# Patient Record
Sex: FEMALE | Race: WHITE | Employment: FULL TIME | URBAN - METROPOLITAN AREA
[De-identification: names, ages, dates, MRNs, and addresses within clinical notes are randomized per-mention and may not be internally consistent; named-entity substitution may affect disease eponyms.]

---

## 2017-01-01 ENCOUNTER — HOSPITAL ENCOUNTER (INPATIENT)
Facility: CLINIC | Age: 22
LOS: 3 days | Discharge: HOME | DRG: 885 | End: 2017-01-05
Attending: PSYCHIATRY & NEUROLOGY | Admitting: PSYCHIATRY & NEUROLOGY
Payer: COMMERCIAL

## 2017-01-01 DIAGNOSIS — R45.89 SUICIDAL BEHAVIOR: ICD-10-CM

## 2017-01-01 DIAGNOSIS — F41.8 DEPRESSION WITH ANXIETY: Primary | ICD-10-CM

## 2017-01-01 PROCEDURE — 12800001 ZZH R&B CD/MH ADOLESCENT

## 2017-01-01 PROCEDURE — 12400001 ZZH R&B MH UMMC

## 2017-01-01 NOTE — IP AVS SNAPSHOT
MRN:[id number]                      After Visit Summary   1/1/2017    Miya Perez    MRN: [id number]           Patient Information     Date Of Birth          1995        About your hospital stay     You were admitted on:  January 1, 2017 You last received care in the:  [facility]    You were discharged on:  January 5, 2017       Who to Call     For medical emergencies, please call 911.  For non-urgent questions about your medical care, please call your primary care provider or clinic, [phone]          Attending Provider     Provider    Robin Graf MD       Primary Care Provider Office Phone # Fax #    Kuhs Story [phone]       [facility] [address] [facility] [address]        Further instructions from your care team       Behavioral Discharge Planning and Instructions      Summary: You were admitted on 1/1/2017 to Station [facility]A for suicidal ideation and depression.  You were treated by Debra Naegele, APRN, CNS and discharged on 1/5/17.     Disposition: Discharged to home    Main Diagnosis:  Major depressive disorder    Health Care Follow-up Appointments:   Therapy Appointment   Date: Monday, January 10, 2017  Time: 10:00am      Provider: Germán Middleton  Address: [facility]. [address] [place] [address]. [place]  [address]  Phone:[phone]     Medication Management   Date: Tuesday, January 17, 2017       Time: 9:30am (arrive 15 mins early to complete paperwork)  Provider: Dr. Daniels  (Telemed provider)  Address: [address] [place]  [address]  Phone:[phone]   [facility] has faxed the Discharge Summary and AVS to this provider at Fax: [phone]      Attend all scheduled appointments with your outpatient providers. Call at least 24 hours in advance if you need to reschedule an appointment to ensure continued access to your outpatient providers.   Major Treatments, Procedures and Findings:  "You were provided with: a psychiatric assessment, assessed for medical stability, medication evaluation and/or management, group therapy and milieu management    Symptoms to Report: mood getting worse or thoughts of suicide    Early warning signs can include:  increased depression or anxiety sleep disturbances increased thoughts or behaviors of suicide or self-harm     Safety and Wellness:  Take all medicines as directed.  Make no changes unless your doctor suggests them.      Follow treatment recommendations.  Refrain from alcohol and non-prescribed drugs.  If there is a concern for safety, call [phone].    Resources: Mental health crisis response for your county is offered 24 hours a day, 7 days a week. A trained counselor will assess your current situation, offer support and counseling and connect you with local resources. Please call [phone]    The treatment team has appreciated the opportunity to work with you. Miya, please take care and make your recovery a daily recovery. If you have any questions or concerns our unit number is [phone].  You will be receiving a follow-up phone call within the next three days from a representative from behavioral health.  You have identified the best phone number to reach you as [phone]              Pending Results     No orders found from 12/31/2016 to 1/2/2017.            Admission Information        Provider Department Dept Phone    1/1/2017 Robin Graf MD [facility]  [phone]      Your Vitals Were     Blood Pressure Pulse Temperature Respirations Weight       113/57 mmHg 58 97.8  F (36.6  C) (Oral) 16 54.432 kg (120 lb)       MyChart Information     [employer] lets you send messages to your doctor, view your test results, renew your prescriptions, schedule appointments and more. To sign up, go to [email or web address] . Click on \"Log in\" on the left side of the screen, which will take you to the Welcome page. Then click on \"Sign up Now\" " on the right side of the page.     You will be asked to enter the access code listed below, as well as some personal information. Please follow the directions to create your username and password.     Your access code is: [email or web address]  Expires: 2017  2:42 PM     Your access code will  in 90 days. If you need help or a new code, please call your [facility] clinic or [phone].        Care EveryWhere ID     This is your Care EveryWhere ID. This could be used by other organizations to access your [facility] medical records  [id number]           Review of your medicines      START taking        Dose / Directions    hydrOXYzine 25 MG tablet   Commonly known as:  ATARAX   Used for:  Depression with anxiety        Dose:  25-50 mg   Take 1-2 tablets (25-50 mg) by mouth every 4 hours as needed for anxiety   Quantity:  120 tablet   Refills:  1       prazosin 500 MCG capsule   Commonly known as:  MINIPRESS   Used for:  Suicidal behavior        Dose:  0.5 mg   Take 1 capsule (0.5 mg) by mouth At Bedtime   Quantity:  30 capsule   Refills:  1         CONTINUE these medicines which have NOT CHANGED        Dose / Directions    ABILIFY 5 MG tablet   Generic drug:  ARIPiprazole        Dose:  5 mg   Take 5 mg by mouth daily To be started 17 per patient.   Refills:  0       TYLENOL 325 MG tablet   Generic drug:  acetaminophen        Dose:  1-2 tablet   Take 1-2 tablets by mouth every 6 hours as needed.   Refills:  0       ZOLOFT PO        Dose:  100 mg   Take 100 mg by mouth daily   Refills:  0            Where to get your medicines      These medications were sent to [facility] [address]  [address] [address]     Phone:  [phone]    - hydrOXYzine 25 MG tablet  - prazosin 500 MCG capsule             Protect others around you: Learn how to safely use, store and throw away your medicines at www.disposemymeds.org.             Medication List: This  is a list of all your medications and when to take them. Check marks below indicate your daily home schedule. Keep this list as a reference.      Medications           Morning Afternoon Evening Bedtime As Needed    ABILIFY 5 MG tablet   Take 5 mg by mouth daily To be started Sunday 1/1/17 per patient.   Last time this was given:  5 mg on 1/4/2017  8:14 PM   Generic drug:  ARIPiprazole                                hydrOXYzine 25 MG tablet   Commonly known as:  ATARAX   Take 1-2 tablets (25-50 mg) by mouth every 4 hours as needed for anxiety                                prazosin 500 MCG capsule   Commonly known as:  MINIPRESS   Take 1 capsule (0.5 mg) by mouth At Bedtime   Last time this was given:  1 mg on 1/4/2017  8:14 PM                                TYLENOL 325 MG tablet   Take 1-2 tablets by mouth every 6 hours as needed.   Last time this was given:  650 mg on 1/4/2017  2:21 PM   Generic drug:  acetaminophen                                ZOLOFT PO   Take 100 mg by mouth daily   Last time this was given:  100 mg on 1/4/2017  8:14 PM

## 2017-01-01 NOTE — IP AVS SNAPSHOT
[facility]    [facility] [address]    [address] [address]    Phone:  [phone]                                       After Visit Summary   1/1/2017    Miya Perez    MRN: [id number]           After Visit Summary Signature Page     I have received my discharge instructions, and my questions have been answered. I have discussed any challenges I see with this plan with the nurse or doctor.    ..........................................................................................................................................  Patient/Patient Representative Signature      ..........................................................................................................................................  Patient Representative Print Name and Relationship to Patient    ..................................................               ................................................  Date                                            Time    ..........................................................................................................................................  Reviewed by Signature/Title    ...................................................              ..............................................  Date                                                            Time

## 2017-01-02 PROBLEM — R45.89 SUICIDAL BEHAVIOR: Status: ACTIVE | Noted: 2017-01-02

## 2017-01-02 PROCEDURE — 25000132 ZZH RX MED GY IP 250 OP 250 PS 637: Performed by: CLINICAL NURSE SPECIALIST

## 2017-01-02 PROCEDURE — 99221 1ST HOSP IP/OBS SF/LOW 40: CPT | Mod: AI | Performed by: CLINICAL NURSE SPECIALIST

## 2017-01-02 PROCEDURE — 12800001 ZZH R&B CD/MH ADOLESCENT

## 2017-01-02 PROCEDURE — 25000132 ZZH RX MED GY IP 250 OP 250 PS 637: Performed by: PSYCHIATRY & NEUROLOGY

## 2017-01-02 PROCEDURE — 12400001 ZZH R&B MH UMMC

## 2017-01-02 PROCEDURE — H2032 ACTIVITY THERAPY, PER 15 MIN: HCPCS

## 2017-01-02 RX ORDER — ACETAMINOPHEN 325 MG/1
650 TABLET ORAL EVERY 4 HOURS PRN
Status: DISCONTINUED | OUTPATIENT
Start: 2017-01-02 | End: 2017-01-05 | Stop reason: HOSPADM

## 2017-01-02 RX ORDER — ALUMINA, MAGNESIA, AND SIMETHICONE 2400; 2400; 240 MG/30ML; MG/30ML; MG/30ML
30 SUSPENSION ORAL EVERY 4 HOURS PRN
Status: DISCONTINUED | OUTPATIENT
Start: 2017-01-02 | End: 2017-01-05 | Stop reason: HOSPADM

## 2017-01-02 RX ORDER — ARIPIPRAZOLE 5 MG/1
5 TABLET ORAL DAILY
COMMUNITY

## 2017-01-02 RX ORDER — ARIPIPRAZOLE 5 MG/1
5 TABLET ORAL DAILY
Status: DISCONTINUED | OUTPATIENT
Start: 2017-01-02 | End: 2017-01-05 | Stop reason: HOSPADM

## 2017-01-02 RX ORDER — IBUPROFEN 200 MG
400 TABLET ORAL EVERY 6 HOURS PRN
Status: DISCONTINUED | OUTPATIENT
Start: 2017-01-02 | End: 2017-01-05 | Stop reason: HOSPADM

## 2017-01-02 RX ORDER — HYDROXYZINE HYDROCHLORIDE 25 MG/1
25-50 TABLET, FILM COATED ORAL EVERY 4 HOURS PRN
Status: DISCONTINUED | OUTPATIENT
Start: 2017-01-02 | End: 2017-01-05 | Stop reason: HOSPADM

## 2017-01-02 RX ADMIN — ARIPIPRAZOLE 5 MG: 5 TABLET ORAL at 20:28

## 2017-01-02 RX ADMIN — IBUPROFEN 400 MG: 200 TABLET, FILM COATED ORAL at 22:28

## 2017-01-02 RX ADMIN — SERTRALINE HYDROCHLORIDE 100 MG: 50 TABLET ORAL at 20:28

## 2017-01-02 ASSESSMENT — ACTIVITIES OF DAILY LIVING (ADL)
GROOMING: INDEPENDENT
AMBULATION: 0-->INDEPENDENT
BATHING: 0-->INDEPENDENT
ORAL_HYGIENE: INDEPENDENT
TRANSFERRING: 0-->INDEPENDENT
DRESS: INDEPENDENT
FALL_HISTORY_WITHIN_LAST_SIX_MONTHS: NO
DRESS: INDEPENDENT
RETIRED_COMMUNICATION: 0-->UNDERSTANDS/COMMUNICATES WITHOUT DIFFICULTY
DRESS: 0-->INDEPENDENT
SWALLOWING: 0-->SWALLOWS FOODS/LIQUIDS WITHOUT DIFFICULTY
TOILETING: 0-->INDEPENDENT
GROOMING: INDEPENDENT
COGNITION: 0 - NO COGNITION ISSUES REPORTED
RETIRED_EATING: 0-->INDEPENDENT
ORAL_HYGIENE: INDEPENDENT

## 2017-01-02 NOTE — PLAN OF CARE
Problem: General Plan of Care (Inpatient Behavioral)  Goal: Team Discussion  Team Plan:   BEHAVIORAL TEAM DISCUSSION    Continued Stay Criteria/Rationale: Patient is newly admitted with depression and SI. Evaluation in process.  Plan: Provide a safe environment and therapeutic milieu. Encourage participation in unit programming. Develop appropriate aftercare plan.  Participants: Lady MEDINASW; Debbie Naegele APRN; CNS  Summary/Recommendation: medication evaluation; referrals for new providers as appropriate.  Medical/Physical: stable  Progress: improving.    Illness Management Recovery model: Personal Plan of Care    Patient has not yet completed Personal Plan of Care, identifying reasons for hospitalization and goals for discharge.

## 2017-01-02 NOTE — PROGRESS NOTES
Pt. Came from [facility].   She recently had a break up with her boyfriend.  She took some of her dad pills and her friends caught her before she swallowed them.  She denies alcohol and drug use although her BAL was .127 her uds was negative.  She was placed on a 72 hour hold while there.  Pt. Is angry about being here.  Currently she denies any psych symptoms.  During the interview she was sarcastic and gave mostly one word answers.  HX of MDD, SIB and LEXIE. Admission completed and pt. went to bed.

## 2017-01-02 NOTE — PROGRESS NOTES
Pt was calm and cooperative today. Her affect is blunted, flat, and tense. She seems distant, but went to all the groups. Speech was pressured when talking 1:1 with her. She was short and gave one word answers. She said she feels safe here and denied thoughts of SI/SIB. Her goal is to be discharged after her 72 HR hold. Said groups were going good and that she is getting stuff out of attending them.       01/02/17 1319   Behavioral Health   Hallucinations denies / not responding to hallucinations   Thinking intact   Orientation person: oriented;place: oriented;date: oriented   Memory baseline memory   Insight insight appropriate to situation   Judgement impaired   Eye Contact at examiner   Affect blunted, flat;tense   Mood mood is calm;other (see comments)  (distant)   Physical Appearance/Attire attire appropriate to age and situation   Hygiene other (see comment)  (fair)   Suicidality other (see comments)  (denies)   Self Injury other (see comment)  (denies)   Activity other (see comment)  (present)   Speech clear;coherent   Psychomotor / Gait balanced;steady   Coping/Psychosocial   Verbalized Emotional State other (see comments)  (says shes doing good)   Plan Of Care Reviewed With patient   Psycho Education   Type of Intervention structured groups   Response participates, initiates socially appropriate   Hours 0.5   Treatment Detail triggers   Activities of Daily Living   Hygiene/Grooming independent   Oral Hygiene independent   Dress independent   Room Organization independent   Groups   Details attended   Behavioral Health Interventions   Depression maintain safe secure environment;provide emotional support   Social and Therapeutic Interventions (Depression) encourage socialization with peers;encourage participation in therapeutic groups and milieu activities

## 2017-01-02 NOTE — PROGRESS NOTES
"Initial Psychosocial Assessment    I have reviewed the chart, met with the patient, and developed Care Plan.  Information for assessment was obtained from patient and chart notes.     Presenting Problem:  Patient was admitted on a 72 hour hold.    Per EMR: 22 YO female brought to ED for evaluation post SA by OD. Pt stated she does not know what medication she took as it was her fathers prescription. Friends state they were able to intervene before pt swallowed the pills; pt reports she swallowed the pills. Pt reports recent break up and went on to state, 'I don't need stressors to be stressed.\" Pt reports low mood, nightmares / sleep disturbance nightly, SI, feelings of hopelessness and worthlessness, fatigue.  Pt denies substance use / abuse. Pt has an OP psychiatrist; has been seeing a therapist off / on for 5 years; does not like either. Pt refused to provide their names. Pt asked friends and family to leave the room during assessment. Pt is prescribed Zoloft. Poor insight, impaired judgement. Hx of cutting; last cut one month ago on her stomach. Denies any hx of violence.  Pt continues to report SI with plan to OD. DX:  MDD, LEXIE. Some oppositional and defiant attitude present.     Today patient indicates she is less angry and feeling better than at admission. She has been attending groups. She would like to discharge on Thursday.  She states she did not swallow pills PTA, that someone stopped her from doing it.     History of Mental Health and Chemical Dependency:  Patient has a history of MDD, LEXIE and SIB.  This is her first mental health admission.    Family Description (Constellation, Family Psychiatric History):  Patient grew up with her parents. No siblings.  Patient is single, no children.    Significant Life Events (Illness, Abuse, Trauma, Death):  Patient reports her dad was in a coma last year due to multiple MIs and also sustained a TBI    Living Situation:  Patient lives with her parents when she is " not in school.  When in school she lives on campus at [employer].    Educational Background:  Patient completed high school and is a freshman in college.    Occupational History:  Patient works part time at a skilled nursing facility, [employer].    Financial Status:  No concerns.  Parents support and are paying for college.    Legal Issues:  None     Ethnic/Cultural Issues:  No issues noted.    Spiritual Orientation:  Not assessed     Service History:  None    Social Functioning (organization, interests):  Patient enjoys shopping, volleyball, Netflix, being with friends, going to the gym.    Current Treatment Providers are:  Psychiatrist is Kush Colon at [facility], final appointment will be 1/27/17.  Therapist is Pamela at [place] Assessment/Plan:  Patient hopes to continue with her PTA medications. She would like assistance with locating mental health providers in [place]. She hopes for discharge on Thursday. She plans to return to school on Sunday to prepare for the new semester. She indicates improvement in her mood since admission.  Patient will continue to meet with the treatment team daily to develop appropriate plan of care and CTC will assist as needed.

## 2017-01-02 NOTE — PROGRESS NOTES
Illness Management Recovery model:  Triggers.     Patient identified the following triggers which may exacerbate their illness:    1. Significant life events  2.   3.

## 2017-01-02 NOTE — PROGRESS NOTES
Pt requesting her PTA medication of Zoloft 100mg and also stated she was to start Abilify 5mg on Sunday 1/1/7 per her last psychiatrist appt.  She sees Kush Wilcox at the [facility].  Received LIS and faxed to Clinic for collateral.  Spoke to inpatient provider and she stated she would be ordering medications.    Changed administration times to 2000 per patient request.
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
## 2017-01-02 NOTE — PROGRESS NOTES
01/02/17 1600   Art Therapy   Type of Intervention structured groups   Response participates with encouragement   Hours 2.5   Participated in Art Therapy directive, inside outside portraits.   Third hour we did sun salutations in a chair and learned some relaxing breaths.  Pt was guarded but did share worked  on her portrait and described how people have misconceptions about her. It was a very simple drawing but had a powerful meaning. She missed yoga but did stay two hours even after her project was done and started some conversation with peers about her hunting and fishing with her father. She joined the drawing [place] and was pleasant with peers and helped select positive songs. She remains guarded but does try to socialize with peers.

## 2017-01-02 NOTE — PROGRESS NOTES
Patient arrived to Station 4A at 2310.  Writer and second female staff took report from EMS, gathered personal care supplies, clean scrubs and assisted the patient with a search.  Patient shirt, bra, pants, shoes and $10.00 were given to her mother per patient request.  Patient signed Consent for EHR and LIS for mom.  Patient spoke with mom briefly.  Mother left and patient went to complete interview with NOC Nurse.  NOC nurse will complete patient admission.

## 2017-01-02 NOTE — PROGRESS NOTES
Pt belongings searched by writer and placed in pt bin:   Makeup bag with various cosmetic products;  1 apple cell phone;  1 cell phone  with block;  3 bottles of clearproof cleaning products.

## 2017-01-03 LAB
ALBUMIN SERPL-MCNC: 3.7 G/DL (ref 3.4–5)
ALP SERPL-CCNC: 69 U/L (ref 40–150)
ALT SERPL W P-5'-P-CCNC: 14 U/L (ref 0–50)
ANION GAP SERPL CALCULATED.3IONS-SCNC: 6 MMOL/L (ref 3–14)
AST SERPL W P-5'-P-CCNC: 12 U/L (ref 0–35)
BASOPHILS # BLD AUTO: 0 10E9/L (ref 0–0.2)
BASOPHILS NFR BLD AUTO: 0.3 %
BILIRUB SERPL-MCNC: 0.8 MG/DL (ref 0.2–1.3)
BUN SERPL-MCNC: 14 MG/DL (ref 7–30)
CALCIUM SERPL-MCNC: 8.9 MG/DL (ref 8.5–10.1)
CHLORIDE SERPL-SCNC: 108 MMOL/L (ref 96–110)
CO2 SERPL-SCNC: 27 MMOL/L (ref 20–32)
CREAT SERPL-MCNC: 0.84 MG/DL (ref 0.5–1)
DIFFERENTIAL METHOD BLD: ABNORMAL
EOSINOPHIL # BLD AUTO: 0.2 10E9/L (ref 0–0.7)
EOSINOPHIL NFR BLD AUTO: 4.8 %
ERYTHROCYTE [DISTWIDTH] IN BLOOD BY AUTOMATED COUNT: 12.5 % (ref 10–15)
GFR SERPL CREATININE-BSD FRML MDRD: 87 ML/MIN/1.7M2
GLUCOSE SERPL-MCNC: 94 MG/DL (ref 70–99)
HCT VFR BLD AUTO: 39.6 % (ref 35–47)
HGB BLD-MCNC: 13.5 G/DL (ref 11.7–15.7)
IMM GRANULOCYTES # BLD: 0 10E9/L (ref 0–0.4)
IMM GRANULOCYTES NFR BLD: 0.3 %
LYMPHOCYTES # BLD AUTO: 1.5 10E9/L (ref 0.8–5.3)
LYMPHOCYTES NFR BLD AUTO: 41.1 %
MCH RBC QN AUTO: 30 PG (ref 26.5–33)
MCHC RBC AUTO-ENTMCNC: 34.1 G/DL (ref 31.5–36.5)
MCV RBC AUTO: 88 FL (ref 78–100)
MONOCYTES # BLD AUTO: 0.2 10E9/L (ref 0–1.3)
MONOCYTES NFR BLD AUTO: 4.8 %
NEUTROPHILS # BLD AUTO: 1.8 10E9/L (ref 1.6–8.3)
NEUTROPHILS NFR BLD AUTO: 48.7 %
NRBC # BLD AUTO: 0 10*3/UL
NRBC BLD AUTO-RTO: 0 /100
PLATELET # BLD AUTO: 155 10E9/L (ref 150–450)
POTASSIUM SERPL-SCNC: 4.2 MMOL/L (ref 3.4–5.3)
PROT SERPL-MCNC: 6.9 G/DL (ref 6.8–8.8)
RBC # BLD AUTO: 4.5 10E12/L (ref 3.8–5.2)
SODIUM SERPL-SCNC: 141 MMOL/L (ref 133–144)
TSH SERPL DL<=0.005 MIU/L-ACNC: 1.08 MU/L (ref 0.4–4)
WBC # BLD AUTO: 3.7 10E9/L (ref 4–11)

## 2017-01-03 PROCEDURE — 99232 SBSQ HOSP IP/OBS MODERATE 35: CPT | Performed by: CLINICAL NURSE SPECIALIST

## 2017-01-03 PROCEDURE — 80053 COMPREHEN METABOLIC PANEL: CPT | Performed by: CLINICAL NURSE SPECIALIST

## 2017-01-03 PROCEDURE — 12800001 ZZH R&B CD/MH ADOLESCENT

## 2017-01-03 PROCEDURE — 90853 GROUP PSYCHOTHERAPY: CPT

## 2017-01-03 PROCEDURE — 36415 COLL VENOUS BLD VENIPUNCTURE: CPT | Performed by: CLINICAL NURSE SPECIALIST

## 2017-01-03 PROCEDURE — 85025 COMPLETE CBC W/AUTO DIFF WBC: CPT | Performed by: CLINICAL NURSE SPECIALIST

## 2017-01-03 PROCEDURE — 84443 ASSAY THYROID STIM HORMONE: CPT | Performed by: CLINICAL NURSE SPECIALIST

## 2017-01-03 PROCEDURE — 25000132 ZZH RX MED GY IP 250 OP 250 PS 637: Performed by: CLINICAL NURSE SPECIALIST

## 2017-01-03 PROCEDURE — 99231 SBSQ HOSP IP/OBS SF/LOW 25: CPT | Performed by: PHYSICIAN ASSISTANT

## 2017-01-03 PROCEDURE — 97150 GROUP THERAPEUTIC PROCEDURES: CPT | Mod: GO

## 2017-01-03 PROCEDURE — 12400001 ZZH R&B MH UMMC

## 2017-01-03 RX ORDER — PRAZOSIN HYDROCHLORIDE 1 MG/1
1 CAPSULE ORAL AT BEDTIME
Status: DISCONTINUED | OUTPATIENT
Start: 2017-01-03 | End: 2017-01-05

## 2017-01-03 RX ADMIN — PRAZOSIN HYDROCHLORIDE 1 MG: 1 CAPSULE ORAL at 21:15

## 2017-01-03 RX ADMIN — ARIPIPRAZOLE 5 MG: 5 TABLET ORAL at 21:15

## 2017-01-03 RX ADMIN — SERTRALINE HYDROCHLORIDE 100 MG: 50 TABLET ORAL at 21:15

## 2017-01-03 ASSESSMENT — ACTIVITIES OF DAILY LIVING (ADL)
DRESS: INDEPENDENT
LAUNDRY: WITH SUPERVISION
GROOMING: INDEPENDENT
LAUNDRY: WITH SUPERVISION
GROOMING: INDEPENDENT
DRESS: INDEPENDENT
ORAL_HYGIENE: INDEPENDENT
ORAL_HYGIENE: INDEPENDENT

## 2017-01-03 NOTE — PLAN OF CARE
"Problem: Depressive Symptoms  Goal: Depressive Symptoms  Signs and symptoms of listed problems will be absent or manageable.    Patient, prior to discharge, will:  -verbalize decrease in depressive signs/symptoms  -verbalize a decrease in anxiety   -verbalize an understanding of medication regimen   -verbalize absence of SI/SIB   -develop a safety plan  -identify a support system   -will participate in coordination of discharge planning    To promote safety/ mental health    Patient identified the following   Triggers:    Wellness Strategies:    Warning Signs:      Feedback (people they would like to receive feedback from if early warning signs):  Friend(s)    Family(s):     Partner/Spouse:     Support Group Member(s):     Co-Worker(s):     Taking Action:    Ways to [place]:      Self-Reflection & Planning.  Assessed patient s progress completing forms related to Illness Management Recovery (including Personal Plan of Care, Adult Coping Plan, and My Support and Coping Plan) and assisted as needed.    Encouraged patient to continue to consider triggers, wellness strategies, early warning signs, feedback from others, actions to take to prevent relapse, and coping strategies as part of a plan to remain well after leaving the hospital.            RN48/     Patient rates depression 2/10* c/o Fatigue or loss of energy  Patient rates anxiety at 1/10* c/o Restlessness  Patient describes mood as \"good.\"    Patient is cooperative and calm appearing Appropriate/mood-congruent and Appropriate to situation . Patient is med-compliant, is eating 75% and reports \"sleeps through the night\". Patient is attending groups. Patient is hopeful stating \"I am feeling better\" and stated GOAL is \"return to school.\"    Patient denies current or recent suicidal ideation    SIB denies    HI: denies current or recent homicidal ideation or behaviors.    VS reviewed: /67 mmHg  Pulse 61  Temp(Src) 98  F (36.7  C) (Oral)  Resp 16  Wt 53.615 kg " (118 lb 3.2 oz) . Patient denies  pain.    Patient evaluation continues. Assessed mood,anxiety,thoughts and behavior. Patient is progressing towards goals. Patient is encouraged to participate in groups and assisted to develop healthy coping skills.     Length of stay: 2    Refer to daily team meeting notes for individualized plan of care. Nursing will continue to assess.    * Scale is offered as scale of 1 to 10 with 10 being the worst.

## 2017-01-03 NOTE — PROGRESS NOTES
Writer met with patient and we dicussed the appointments that have been scheduled for her for when she returns to [place].There is no note indicating that we have had contact with patient's family so writer will make contact tomorrow after speaking to our NP. Pt is planning to d/c on Thursday.

## 2017-01-03 NOTE — PROGRESS NOTES
01/02/17 2200   Behavioral Health   Hallucinations denies / not responding to hallucinations   Thinking intact   Orientation place: oriented;date: oriented;time: oriented   Memory baseline memory   Insight insight appropriate to situation   Judgement impaired   Eye Contact at examiner   Affect blunted, flat   Mood mood is calm   Physical Appearance/Attire attire appropriate to age and situation   Hygiene other (see comment)  (fair)   Suicidality other (see comments)  (denies)   Self Injury other (see comment)  (denies)   Activity other (see comment)  (present)   Speech clear;coherent   Psychomotor / Gait balanced   Psycho Education   Type of Intervention structured groups   Response participates with encouragement   Hours 0.5   Treatment Detail non verbal   Activities of Daily Living   Hygiene/Grooming independent   Oral Hygiene independent   Dress independent   Room Organization independent     Patient spent most of the evening on the milieu, social with peers, attended and participated in group, patient is blunted/flat, calm. Patient mentioned that she was feeling agitated at level 3, irritable a level 3. Patient mentioned to staff that she feel like she is dreaming while awake. No SI or SIB, patient did state that she has some back pain but feeling ok, eating good, sleeping fine. Overall patient had a good evening.

## 2017-01-03 NOTE — PROGRESS NOTES
"[facility], [place]   Psychiatric Progress Note        Interim History:   The patient's care was discussed with the treatment team during the daily team meeting and/or staff's chart notes were reviewed.  Staff report patient is angry and irritable. States she feels like she is dreaming while she is awake.     Patient was more cooperative today. She reports feeling more tired today. She reports that she is making an effort to go to groups and learn coping skills. She reported that she learned about the importance of exercise in daily life and how it relates to mood. She stated, \" I want to learn what I need to and go to groups so that I can leave\". She denies any suicidal ideation. She states her mood is \"better\". She had brain reye contact and was less oppositional.          Medications:       ARIPiprazole  5 mg Oral Daily     sertraline  100 mg Oral Daily          Allergies:     Allergies   Allergen Reactions     Bactrim [Sulfamethoxazole W/Trimethoprim]      Gardasil [Quadrivalent Hpv, 6,11,16,18] Unknown          Labs:   No results found for this or any previous visit (from the past 24 hour(s)).       Psychiatric Examination:   /70 mmHg  Pulse 63  Temp(Src) 98.7  F (37.1  C) (Oral)  Resp 16  Weight is 0 lbs 0 oz  There is no height or weight on file to calculate BMI.    Appearance: awake, alert, dressed in hospital scrubs and fatigued  Attitude:  cooperative  Eye Contact:  good  Mood:  better  Affect:  intensity is blunted  Speech:  clear, coherent  Psychomotor Behavior:  no evidence of tardive dyskinesia, dystonia, or tics  Throught Process:  logical, linear and goal oriented  Associations:  no loose associations  Thought Content:  no evidence of suicidal ideation or homicidal ideation  Insight:  fair  Judgement:  fair  Oriented to:  time, person, and place  Attention Span and Concentration:  intact  Recent and Remote Memory:  intact         Precautions:     Behavioral " "Orders   Procedures     Code 1 - Restrict to Unit     Routine Programming     As clinically indicated     Self Injury Precaution     Status 15     Every 15 minutes.     Suicide precautions          DIagnoses:   1.  Major depressive disorder, recurrent, severe.    2.  Anxiety disorder, not otherwise specified.    3.  Cluster B personality traits.      4.  Suicidal ideation.           Plan:   Legal: 72 hour hold    Medication management: Started Sertraline and Abilify. Reviewed benefits, risks and side effects from Abilify and Sertraline. Patient signed a neuroleptic consent. Started Prazosin for nightmares.     Disposition: Patient presents as less angry and propositional today. She has been attending groups and says that it has changed her attitude. \"I accept that I am here. She wants to leave by roque of week. She starts school next week. Social work setting up resources for her at [facility].        "

## 2017-01-03 NOTE — PROGRESS NOTES
"   01/03/17 1600   Occupational Therapy   Type of Intervention structured groups   Response Participates with encouragement   Hours 3   Observations: pt was quiet with a flat affect but did participate in discussion.  Group Description:   Pt attended and participated in a structured occupational therapy group session. Pt were educated on therapeutic benefit of Origami as a low-cost coping mechanism. Pt were provided a variety of instructions and 1:1 assistance during group to complete instructions. Pt's identified experiences and benefits of activity for mental health.   Pt participated in OT clinic, working on completing a self-initiated project facilitated by OT and graded to appropriate functional performance.   Pt participated in PM topic group, focused on coping mechanisms and dealing with anxiety. Pt engaged in a therapeutic conversation about positive coping skills and supports in the context of \"Battleship\" with a paper/pencil print out game involving groups in pairs. Pt identified ways to effectively manage thoughts, emotions, and actions and felt comfortable sharing with staff and peers.    "

## 2017-01-03 NOTE — CONSULTS
HISTORY OF PRESENT ILLNESS:  Miya Perez is a 21-year-old female admitted to station 4A, status post suicide attempt by overdose.  Apparently she took an unknown amount of her father's medication and her friends were told and subsequently brought to outside hospital Emergency Department where she was medically stabilized prior to transfer.  This was within the context of alcohol abuse as her blood alcohol level was initially 0.127.  An Internal Medicine consultation was ordered by Dr. Graf's team to assess medical problems including mild leukopenia on admission labs.  At this time, Miya denies acute physical concerns including fever, chills, headache, sore throat, cough, chest pain, shortness of breath, abdominal pain, nausea, bowel or bladder concerns.      PAST MEDICAL HISTORY:   1.  Psychiatric history per Dr. Graf's team.   2.  Denies history of major medical problems including cardiopulmonary disease, hypertension and diabetes.      PAST SURGICAL HISTORY:  None.      ADMISSION MEDICATIONS:  Reviewed and listed in the medication reconciliation list.      ALLERGIES AND ADVERSE EFFECTS:  Bactrim and Gardasil.      SOCIAL HISTORY:  Single.  No children.  Lives normally at a residence at a college in [place].  Denies smoking cigarettes.  Denies illicit drug use.  Occasionally drinks alcohol.      FAMILY HISTORY:  Reviewed and noncontributory.      REVIEW OF SYSTEMS:  Ten-point review of systems negative except as stated above in history of present illness.      PHYSICAL EXAMINATION:   GENERAL:  Very healthy-appearing young woman in no acute distress.   VITAL SIGNS:  Normal.   HEENT:  Negative.   NECK:  Supple.  No cervical lymphadenopathy or thyromegaly.   LUNGS:  Clear.   CARDIOVASCULAR:  Regular rhythm, no murmurs appreciated.   ABDOMEN:  Soft, nontender.   EXTREMITIES:  No edema.   SKIN:  No rash on exposed areas.   NEUROLOGIC:  Grossly nonfocal.      LABORATORY DATA:  White blood cell count 3.7, otherwise  rest of CBC, comprehensive metabolic panel and TSH normal.      IMPRESSION:   1.  Depression and alcohol abuse, status post apparent suicide attempt by Dr. Graf's team.  There does not appear to be any ongoing medical sequelae from recent overdose and/or alcohol intoxication.   2.  Mild leukopenia on admission labs, most likely due to recent alcohol abuse versus normal variant.  Reassuring differential normal.   3.  Otherwise, overall apparent normally good physical health.      PLAN:  No medical intervention indicated at this time.  The patient is currently medically stable and she was instructed to follow up with family physician after discharge for repeat labs including white blood cell count. Medicine will sign off.  Please feel free to call with questions.         LUÍS BLACK PA-C             D: 2017 14:13   T: 2017 14:40   MT: aldair      Name:     MARY SYLVESTER   MRN:      [id number]        Account:       [id number]   :      1995           Consult Date:  2017      Document: [id number]       cc: Robin Graf MD

## 2017-01-04 PROCEDURE — 99239 HOSP IP/OBS DSCHRG MGMT >30: CPT | Performed by: CLINICAL NURSE SPECIALIST

## 2017-01-04 PROCEDURE — H2032 ACTIVITY THERAPY, PER 15 MIN: HCPCS

## 2017-01-04 PROCEDURE — 25000132 ZZH RX MED GY IP 250 OP 250 PS 637: Performed by: CLINICAL NURSE SPECIALIST

## 2017-01-04 PROCEDURE — 12400001 ZZH R&B MH UMMC

## 2017-01-04 PROCEDURE — 25000132 ZZH RX MED GY IP 250 OP 250 PS 637: Performed by: PSYCHIATRY & NEUROLOGY

## 2017-01-04 RX ORDER — HYDROXYZINE HYDROCHLORIDE 25 MG/1
25-50 TABLET, FILM COATED ORAL EVERY 4 HOURS PRN
Qty: 120 TABLET | Refills: 1 | Status: SHIPPED | OUTPATIENT
Start: 2017-01-04

## 2017-01-04 RX ORDER — PRAZOSIN HYDROCHLORIDE 1 MG/1
1 CAPSULE ORAL AT BEDTIME
Qty: 30 CAPSULE | Refills: 1 | Status: SHIPPED | OUTPATIENT
Start: 2017-01-04 | End: 2017-01-05

## 2017-01-04 RX ADMIN — ACETAMINOPHEN 650 MG: 325 TABLET, FILM COATED ORAL at 14:21

## 2017-01-04 RX ADMIN — SERTRALINE HYDROCHLORIDE 100 MG: 50 TABLET ORAL at 20:14

## 2017-01-04 RX ADMIN — PRAZOSIN HYDROCHLORIDE 1 MG: 1 CAPSULE ORAL at 20:14

## 2017-01-04 RX ADMIN — ARIPIPRAZOLE 5 MG: 5 TABLET ORAL at 20:14

## 2017-01-04 ASSESSMENT — ACTIVITIES OF DAILY LIVING (ADL)
GROOMING: INDEPENDENT
ORAL_HYGIENE: INDEPENDENT
HYGIENE/GROOMING: INDEPENDENT
ORAL_HYGIENE: INDEPENDENT
DRESS: SCRUBS (BEHAVIORAL HEALTH);INDEPENDENT
DRESS: STREET CLOTHES;INDEPENDENT
LAUNDRY: WITH SUPERVISION
LAUNDRY: WITH SUPERVISION

## 2017-01-04 NOTE — PROGRESS NOTES
01/04/17 1600   Art Therapy   Type of Intervention structured groups   Response participates with encouragement   Hours 1.5   Groups today  Art Therapy- Affirmations- affirmation mandalas, hour 2 free choice art and leisure  Hour 3- DBT mindfulness emotional/rational/wise mind sketch and mindfulness discussion and writings.   Pt attended about 1.5 hours , she worked on a few projects, she was open to encouragement when she was done with the first project to try a new material and explore chalk pastels and blending. She seemed to enjoy. She was very quiet and private today and didn't want to share her work, she left early to bring the work back to her room.

## 2017-01-04 NOTE — DISCHARGE SUMMARY
"Psychiatric Discharge Summary    Miya Perez MRN# [id number]   Age: 21 year old YOB: 1995     Date of Admission:  1/1/2017  Date of Discharge:  1/5/2017  Admitting Physician:  Robin Graf MD  Discharge Physician:  Debra A. Naegele, APRN CNP (Contact: [phone]         Event Leading to Hospitalization:   The patient reports a recent breakup with her boyfriend.  She said it happened about a week ago.  She reports it was a very painful experience for her.  She had a \"bad conversation\" with her ex and then tried to overdose on her father's medications.  Friends say that they were able to intervene before she swallowed the pills.  The patient reports that she swallowed the pills.  The patient reports low mood, nightmares, sleep disturbance and suicidal ideation.  The patient states that she has \"waves.\"  She says it feels like a \"ghost walking through me.\"  She also reports feeling very angry.  She said she is not able to control it and it has interfered in her interpersonal relationships.  She reports she has not been taking her medications for the last couple of years.             See Admission note by Debra Naegele APRN, [facility] on 1/2/2017 for additional details.          DIagnoses:   1.  Major depressive disorder, recurrent, severe.    2.  Anxiety disorder, not otherwise specified.    3.  Cluster B personality traits.      4.  Suicidal ideation.               Labs:     Results for orders placed or performed during the hospital encounter of 01/01/17   CBC with platelets differential   Result Value Ref Range    WBC 3.7 (L) 4.0 - 11.0 10e9/L    RBC Count 4.50 3.8 - 5.2 10e12/L    Hemoglobin 13.5 11.7 - 15.7 g/dL    Hematocrit 39.6 35.0 - 47.0 %    MCV 88 78 - 100 fl    MCH 30.0 26.5 - 33.0 pg    MCHC 34.1 31.5 - 36.5 g/dL    RDW 12.5 10.0 - 15.0 %    Platelet Count 155 150 - 450 10e9/L    Diff Method Automated Method     % Neutrophils 48.7 %    % Lymphocytes 41.1 %    % Monocytes 4.8 %    % " Eosinophils 4.8 %    % Basophils 0.3 %    % Immature Granulocytes 0.3 %    Nucleated RBCs 0 0 /100    Absolute Neutrophil 1.8 1.6 - 8.3 10e9/L    Absolute Lymphocytes 1.5 0.8 - 5.3 10e9/L    Absolute Monocytes 0.2 0.0 - 1.3 10e9/L    Absolute Eosinophils 0.2 0.0 - 0.7 10e9/L    Absolute Basophils 0.0 0.0 - 0.2 10e9/L    Abs Immature Granulocytes 0.0 0 - 0.4 10e9/L    Absolute Nucleated RBC 0.0    Comprehensive metabolic panel   Result Value Ref Range    Sodium 141 133 - 144 mmol/L    Potassium 4.2 3.4 - 5.3 mmol/L    Chloride 108 96 - 110 mmol/L    Carbon Dioxide 27 20 - 32 mmol/L    Anion Gap 6 3 - 14 mmol/L    Glucose 94 70 - 99 mg/dL    Urea Nitrogen 14 7 - 30 mg/dL    Creatinine 0.84 0.50 - 1.00 mg/dL    GFR Estimate 87 >60 mL/min/1.7m2    GFR Estimate If Black >90   GFR Calc   >60 mL/min/1.7m2    Calcium 8.9 8.5 - 10.1 mg/dL    Bilirubin Total 0.8 0.2 - 1.3 mg/dL    Albumin 3.7 3.4 - 5.0 g/dL    Protein Total 6.9 6.8 - 8.8 g/dL    Alkaline Phosphatase 69 40 - 150 U/L    ALT 14 0 - 50 U/L    AST 12 0 - 35 U/L   TSH with free T4 reflex   Result Value Ref Range    TSH 1.08 0.40 - 4.00 mU/L   Internal Medicine Psych Adult IP Consult - [facility]: Patient to be seen: Routine within 24 hrs; Call back #: [phone] Deb Naegele; Pt came from [facility]; Consultant may enter orders: Yes    Narrative    Son Zhang PA-C     1/3/2017  2:09 PM  Medicine consult dictated.             Consults:   Internal medicine for H & P 1/3/2017  HISTORY OF PRESENT ILLNESS:  Miya Perez is a 21-year-old female admitted to station 4A, status post suicide attempt by overdose.  Apparently she took an unknown amount of her father's medication and her friends were told and subsequently brought to outside hospital Emergency Department where she was medically stabilized prior to transfer.  This was within the context of alcohol abuse as her blood alcohol level was initially 0.127.  An Internal  Medicine consultation was ordered by Dr. Graf's team to assess medical problems including mild leukopenia on admission labs.  At this time, Miya denies acute physical concerns including fever, chills, headache, sore throat, cough, chest pain, shortness of breath, abdominal pain, nausea, bowel or bladder concerns.        PAST MEDICAL HISTORY:    1.  Psychiatric history per Dr. Graf's team.    2.  Denies history of major medical problems including cardiopulmonary disease, hypertension and diabetes.        PAST SURGICAL HISTORY:  None.        ADMISSION MEDICATIONS:  Reviewed and listed in the medication reconciliation list.        ALLERGIES AND ADVERSE EFFECTS:  Bactrim and Gardasil.        SOCIAL HISTORY:  Single.  No children.  Lives normally at a residence at a college in [place].  Denies smoking cigarettes.  Denies illicit drug use.  Occasionally drinks alcohol.        FAMILY HISTORY:  Reviewed and noncontributory.        REVIEW OF SYSTEMS:  Ten-point review of systems negative except as stated above in history of present illness.        PHYSICAL EXAMINATION:    GENERAL:  Very healthy-appearing young woman in no acute distress.    VITAL SIGNS:  Normal.    HEENT:  Negative.    NECK:  Supple.  No cervical lymphadenopathy or thyromegaly.    LUNGS:  Clear.    CARDIOVASCULAR:  Regular rhythm, no murmurs appreciated.    ABDOMEN:  Soft, nontender.    EXTREMITIES:  No edema.    SKIN:  No rash on exposed areas.    NEUROLOGIC:  Grossly nonfocal.        LABORATORY DATA:  White blood cell count 3.7, otherwise rest of CBC, comprehensive metabolic panel and TSH normal.        IMPRESSION:    1.  Depression and alcohol abuse, status post apparent suicide attempt by Dr. Graf's team.  There does not appear to be any ongoing medical sequelae from recent overdose and/or alcohol intoxication.    2.  Mild leukopenia on admission labs, most likely due to recent alcohol abuse versus normal variant.  Reassuring differential normal.     3.  Otherwise, overall apparent normally good physical health.        PLAN:  No medical intervention indicated at this time.  The patient is currently medically stable and she was instructed to follow up with family physician after discharge for repeat labs including white blood cell count. Medicine will sign off.  Please feel free to call with questions.            LUÍS BLACK PA-C            [facility] Course:   Miya Perez was admitted to Station 4A with attending Robin Graf MD on a 72 hour mental health hold. The patient was placed under status 15 (15 minute checks) to ensure patient safety. Patient signed in voluntary.     Patient was admitted with suicidal ideation. She was cooperative with discussing her medications and symptoms. She was continued on Sertraline and started on Abilify. She reported that her outpatient psychiatrist was going to start her on Abilify. She reported that she has unsettling dreams and was started on Prazosin. She had a hypotensive incident so her dose was cut in half to 500 mcg and educated about staying hydrating and not restricting food intake.  She used Vistaril for anxiety.     Miya Perez did participate in groups and was visible in the milieu.The patient's symptoms of suicidal ideation improved. She made an effort to go to groups and learn skills. She was motivated to improve her mental illness symptoms with coping skills. After careful assessment , Ms Perez denies having any suicidal ideation. She has protective factors of being invested in improving her coping skills, she showed an interest in her medication and learning about her symptoms. She is motivated to return to school at [employer] next week.     Miya Perez was released to home. At the time of discharge Miya Perez was determined to not be a danger to herself or others.          Discharge Medications:     Current Discharge Medication List      START taking these medications    Details    hydrOXYzine (ATARAX) 25 MG tablet Take 1-2 tablets (25-50 mg) by mouth every 4 hours as needed for anxiety  Qty: 120 tablet, Refills: 1    Associated Diagnoses: Depression with anxiety      prazosin (MINIPRESS) 1 MG capsule Take 1 capsule (0.5 mg) by mouth At Bedtime  Qty: 30 capsule, Refills: 1    Associated Diagnoses: Suicidal behavior         CONTINUE these medications which have NOT CHANGED    Details   Sertraline HCl (ZOLOFT PO) Take 100 mg by mouth daily      ARIPiprazole (ABILIFY) 5 MG tablet Take 5 mg by mouth daily To be started Sunday 1/1/17 per patient.      acetaminophen (TYLENOL) 325 MG tablet Take 1-2 tablets by mouth every 6 hours as needed.                  Psychiatric Examination:   Appearance:  awake, alert and adequately groomed  Attitude:  cooperative  Eye Contact:  good  Mood:  better  Affect:  intensity is blunted  Speech:  clear, coherent  Psychomotor Behavior:  no evidence of tardive dyskinesia, dystonia, or tics  Thought Process:  logical, linear and goal oriented  Associations:  no loose associations  Thought Content:  no evidence of suicidal ideation or homicidal ideation  Insight:  fair  Judgment:  fair  Oriented to:  time, person, and place  Attention Span and Concentration:  intact  Recent and Remote Memory:  intact  Language: Able to name objects, Able to repeat phrases and Able to read and write  Fund of Knowledge: adequate  Muscle Strength and Tone: normal  Gait and Station: Normal         Discharge Plan:   Health Care Follow-up Appointments:   Therapy Appointment   Date: Monday, January 10, 2017   Time: 10:00am   Provider: Germán Middleton   Address: [facility]. [address] [place] [address]. [place] [address]   Phone:[phone]   Medication Management   Date: Tuesday, January 17, 2017   Time: 9:30am (arrive 15 mins early to complete paperwork)   Provider: Dr. Daniels (Telemed provider)   Address: [facility]. [address] [place] [address]. [place] [address]   Phone:[phone]   [facility] has faxed  the Discharge Summary and AVS to this provider at Fax: [phone]   Attend all scheduled appointments with your outpatient providers. Call at least 24 hours in advance if you need to reschedule an appointment to ensure continued access to your outpatient providers.   Major Treatments, Procedures and Findings: You were provided with: a psychiatric assessment, assessed for medical stability, medication evaluation and/or management, group therapy and milieu management   Symptoms to Report: mood getting worse or thoughts of suicide   Early warning signs can include: increased depression or anxiety sleep disturbances increased thoughts or behaviors of suicide or self-harm   Safety and Wellness: Take all medicines as directed. Make no changes unless your doctor suggests them. Follow treatment recommendations. Refrain from alcohol and non-prescribed drugs. If there is a concern for safety, call [phone].   Resources: Mental health crisis response for your county is offered 24 hours a day, 7 days a week. A trained counselor will assess your current situation, offer support and counseling and connect you with local resources. Please call [phone]   The treatment team has appreciated the opportunity to work with you. Miya, please take care and make your recovery a daily recovery. If you have any questions or concerns our unit number is [phone]. You will be receiving a follow-up phone call within the next three days from a representative from behavioral health. You have identified the best phone number to reach you as [phone]     Attestation:  The patient has been seen and evaluated by me,  Debra Naegele APRN, CNS   Discharge > 30 minutes

## 2017-01-04 NOTE — PROGRESS NOTES
01/04/17 1300   Behavioral Health   Hallucinations denies / not responding to hallucinations   Thinking intact   Orientation person: oriented;place: oriented;date: oriented;time: oriented   Memory baseline memory   Insight insight appropriate to situation;insight appropriate to events   Judgement intact   Eye Contact at examiner   Affect blunted, flat   Mood mood is calm   Physical Appearance/Attire attire appropriate to age and situation;neat   Hygiene well groomed   Suicidality other (see comments)  (denies)   Self Injury other (see comment)  (denies)   Activity (Active in the milieu)   Speech clear;coherent   Medication Sensitivity no stated side effects   Psychomotor / Gait balanced   Activities of Daily Living   Hygiene/Grooming independent   Oral Hygiene independent   Dress scrubs (behavioral health);independent   Laundry with supervision   Room Organization independent   Behavioral Health Interventions   Depression maintain safety precautions;maintain safe secure environment;assist patient in following safety plan;encourage nutrition and hydration;encourage participation / independence with adls;provide emotional support;establish therapeutic relationship;assist with developing and utilizing healthy coping strategies;build upon strengths   Social and Therapeutic Interventions (Depression) encourage socialization with peers;encourage participation in therapeutic groups and milieu activities     Pt is to discharge tomorrow, 1/5/2017. Pt is calm, pleasant, and approachable. Pt attended and participated in groups. Pt is independent with ADL's. Pt denies SI and SIB. Pt denies hallucinations/psychotic symptoms.

## 2017-01-04 NOTE — PROGRESS NOTES
[facility], [place]   Psychiatric Progress Note        Interim History:   The patient's care was discussed with the treatment team during the daily team meeting and/or staff's chart notes were reviewed.  Staff report patient is attending groups and tying to learn coping skills.     Patient reports her mood has improved. She denies having any suicidal ideation. She continues to present with a flat affect. She has been motivated to attend groups and work on her coping skills. She continues to have distressing dreams. She reports she has had distressing dreams for the past two years. She started Prazosin .         Medications:       prazosin  1 mg Oral At Bedtime     ARIPiprazole  5 mg Oral Daily     sertraline  100 mg Oral Daily          Allergies:     Allergies   Allergen Reactions     Bactrim [Sulfamethoxazole W/Trimethoprim]      Gardasil [Quadrivalent Hpv, 6,11,16,18] Unknown          Labs:   No results found for this or any previous visit (from the past 24 hour(s)).       Psychiatric Examination:   /67 mmHg  Pulse 61  Temp(Src) 98  F (36.7  C) (Oral)  Resp 16  Wt 53.615 kg (118 lb 3.2 oz)  Weight is 118 lbs 3.2 oz  There is no height on file to calculate BMI.    Appearance: awake, alert and adequately groomed  Attitude:  cooperative  Eye Contact:  good  Mood:  better  Affect:  intensity is blunted  Speech:  clear, coherent  Psychomotor Behavior:  no evidence of tardive dyskinesia, dystonia, or tics  Throught Process:  logical, linear and goal oriented  Associations:  no loose associations  Thought Content:  no evidence of suicidal ideation or homicidal ideation  Insight:  fair  Judgement:  fair  Oriented to:  time, person, and place  Attention Span and Concentration:  intact  Recent and Remote Memory:  intact         Precautions:     Behavioral Orders   Procedures     Code 1 - Restrict to Unit     Routine Programming     As clinically indicated     Self Injury Precaution      Status 15     Every 15 minutes.     Suicide precautions          DIagnoses:   1.  Major depressive disorder, recurrent, severe.    2.  Anxiety disorder, not otherwise specified.    3.  Cluster B personality traits.      4.  Suicidal ideation.              Plan:   Legal: voluntary    Medication management: Started Sertraline and Abilify. Reviewed benefits, risks and side effects from Abilify and Sertraline. Patient signed a neuroleptic consent. Started Prazosin for nightmares.     Disposition: Patient will be discharging to home on 1/5/2017.

## 2017-01-04 NOTE — PROGRESS NOTES
01/03/17 2259   Behavioral Health   Hallucinations denies / not responding to hallucinations   Thinking intact   Orientation person: oriented;place: oriented;date: oriented;time: oriented   Memory baseline memory   Insight insight appropriate to situation   Judgement impaired   Affect blunted, flat   Mood mood is calm   Physical Appearance/Attire attire appropriate to age and situation   Hygiene other (see comment)  (fair)   Suicidality other (see comments)  (denies)   Self Injury other (see comment)  (denies)   Activity other (see comment)  (present)   Speech clear;coherent   Psychomotor / Gait balanced   Psycho Education   Type of Intervention 1:1 intervention   Response participates with encouragement   Hours 0.5   Treatment Detail check in   Activities of Daily Living   Hygiene/Grooming independent   Oral Hygiene independent   Dress independent   Laundry with supervision   Room Organization independent     Patient spent most of the evening sleeping, did not attend group, fairly social with others,  checked in with staff and mentioned that the reason she was sleeping was because she did not have her glasses. When parents visited they brought in patients glasses. Overall patient had a good evening.

## 2017-01-05 VITALS
WEIGHT: 120 LBS | HEART RATE: 58 BPM | RESPIRATION RATE: 16 BRPM | TEMPERATURE: 97.8 F | SYSTOLIC BLOOD PRESSURE: 113 MMHG | DIASTOLIC BLOOD PRESSURE: 57 MMHG

## 2017-01-05 LAB — GLUCOSE BLDC GLUCOMTR-MCNC: 108 MG/DL (ref 70–99)

## 2017-01-05 PROCEDURE — 00000146 ZZHCL STATISTIC GLUCOSE BY METER IP

## 2017-01-05 PROCEDURE — H2032 ACTIVITY THERAPY, PER 15 MIN: HCPCS

## 2017-01-05 ASSESSMENT — ACTIVITIES OF DAILY LIVING (ADL)
ORAL_HYGIENE: INDEPENDENT
DRESS: INDEPENDENT
GROOMING: INDEPENDENT

## 2017-01-05 NOTE — PROGRESS NOTES
Writer met with patient and went over discharge plans and patient's relapse prevention plan. Patient verbalized understanding.  Copy of plan placed in chart.

## 2017-01-05 NOTE — PROGRESS NOTES
0810 am, pt reports feeling lightheaded, nausea and weak in the knees, difficulty hearing and seeing, observed to be pale in complexion, skin clammy, staff assist pt to sit down and obtained VS, writer checks BG with result of 108 at 0815. Pt given orange juice, ice chips with observance, once pt reports feeling better yet says her hands are somewhat tingling, she stood up walked to Dining room with balanced steady gait and now eating breakfast. Pt reports feeling lightheaded and somewhat similar to this morning yesterday. Pt reports last Bowel movement yesterday and has been eating and drinking just fine, denies any other symptoms. Pt takes Abilify 5 mg of which she states she was taking for a month prior to hospitalization and started a new medication minipress 2 days ago for nightmares; pt states she has been making slower positional changes and trying to increase her fluid intake; will inform her provider of incident r/t hypotension and evaluate her medications.     01/05/17 0810   Vital Signs   Temp 97.6  F (36.4  C)   Temp src Oral   Resp 14   Pulse 77   Pulse/Heart Rate Source Monitor   BP 96/47 mmHg   Sitting Orthostatic BP   Sitting Orthostatic BP 96/47 mmHg   Sitting Orthostatic Pulse 77 bpm   Pain/Comfort   Patient Currently in Pain denies   Preferred Pain Scale CAPA (Clinically Aligned Pain Assessment) ([facility], [facility] Adults Only)

## 2017-01-05 NOTE — PLAN OF CARE
"Problem: General Plan of Care (Inpatient Behavioral)  Goal: Individualization/Patient Specific Goal (IP Behavioral)  The patient and/or their representative will achieve their patient-specific goals related to the plan of care.    The patient-specific goals include:     Illness Management Recovery model: Objectives  Patient will identify reason(s) for hospitalization from their perspective.  Patient will identify a minimum of three goals for discharge.  Patient will identify a minimum of three triggers that may increase their symptoms.  Patient will identify a minimum of three coping skills they can do to stay well.   Patient will identify their support system to demonstrate readiness for discharge.    Illness Management & Recovery assists patient to develop relapse prevention as  patient identifies triggers for relapse.  patient identifies a general wellness strategy.  patient identifies the warning signs that they are in danger of relapse.  patient identifies someone they count on to get feedback .  patient identifies ways to take action when in danger of relapse.  patient identifies way to cope with stress or other symptoms.   patient participates in self-reflection.    The patient and/or their representative will achieve their patient-specific goals related to the plan of care.    The patient-specific goals include:     \"Reasons you are in the hospital;\" The patient identifies the following reasons for current hospitalization:     1.  Trying to kill myself  2.  History of depression  3.  Panic attack  4.  Outpatient therapy didn't work    \"Goals for Discharge\" The patient identifies the following goals for discharge:    1.  Go to all therapy  2.  Take info away from groups  3.  Go home Thursday           JOANN Rod  Clinical Treatment Coordinator              "

## 2017-01-05 NOTE — PROGRESS NOTES
"Pt ate 100% of her breakfast and states, \"feeling much better, still somewhat tired, but no lightheadedness or dizziness or nausea,\" Pt given education on medications and enforced the importance of maintaining hydration. Review of all discharge instructions and follow-up appointments. Pt denies any pain, SI/SIB or HI, denies any significant anxiety or anger, no racing or disturbing thoughts. Demonstrated ability to remain calm while awaiting for the decreased dose of minipress. Pt agrees to inform her doctor if she experiences any further lightheadedness or dizziness that is not improved with adequate hydration and rest/re-enforced importance of slow positional changes while adjusting to medications. Pt discharge to home today left unit at 12 noon accompanied by mother, taking all personal items, instructions and discharge medications.  "

## 2017-01-05 NOTE — PROGRESS NOTES
01/04/17 2124   Behavioral Health   Hallucinations denies / not responding to hallucinations   Thinking intact   Orientation person: oriented;place: oriented   Memory baseline memory   Insight insight appropriate to events   Judgement intact   Eye Contact at examiner   Affect full range affect   Mood mood is calm   Physical Appearance/Attire appears stated age;attire appropriate to age and situation   Hygiene well groomed   Suicidality other (see comments)  (Pt denies)   Self Injury other (see comment)  (Denies)   Activity other (see comment)  (Participative)   Speech clear;coherent   Psychomotor / Gait balanced;steady   Sleep/Rest/Relaxation   Day/Evening Time Hours up all shift   Coping/Psychosocial   Verbalized Emotional State acceptance;happiness   Activities of Daily Living   Hygiene/Grooming independent   Oral Hygiene independent   Dress street clothes;independent   Laundry with supervision   Room Organization independent   Behavioral Health Interventions   Depression maintain safety precautions;maintain safe secure environment;provide emotional support;establish therapeutic relationship;build upon strengths;encourage participation / independence with adls   Social and Therapeutic Interventions (Depression) encourage socialization with peers;encourage effective boundaries with peers;encourage participation in therapeutic groups and milieu activities   Patient participated in group activities, and reported that she feels good and happy. Patient reported that she feels excited to be going back to school once discharged. She shared that she felt like falling and things turn dark whenever she tries getting up. Asked if she has  reported that to her doctor or RN, patient responded that she thinks what is happening may be from a new medication she started today. Staff encouraged patient to talk to her RN about how she is feeling  immediately. Overall, patient appears calm, pleasant, social with her peers, and  accepts redirections.

## 2017-01-07 NOTE — DISCHARGE INSTRUCTIONS
Behavioral Discharge Planning and Instructions      Summary: You were admitted on 1/1/2017 to [facility] for suicidal ideation and depression.  You were treated by Debra Naegele, APRN, CNS and discharged on 1/5/17.     Disposition: Discharged to home    Main Diagnosis:  Major depressive disorder    Health Care Follow-up Appointments:   Therapy Appointment   Date: Monday, January 10, 2017  Time: 10:00am      Provider: Germán Middleton  Address: [address] [place]  [address]  Phone:[phone]     Medication Management   Date: Tuesday, January 17, 2017       Time: 9:30am (arrive 15 mins early to complete paperwork)  Provider: Dr. Daniels  (Telemed provider)  Address: [address] [place]  [address]  Phone:[phone]   [facility] has faxed the Discharge Summary and AVS to this provider at Fax: [phone]      Attend all scheduled appointments with your outpatient providers. Call at least 24 hours in advance if you need to reschedule an appointment to ensure continued access to your outpatient providers.   Major Treatments, Procedures and Findings: You were provided with: a psychiatric assessment, assessed for medical stability, medication evaluation and/or management, group therapy and milieu management    Symptoms to Report: mood getting worse or thoughts of suicide    Early warning signs can include:  increased depression or anxiety sleep disturbances increased thoughts or behaviors of suicide or self-harm     Safety and Wellness:  Take all medicines as directed.  Make no changes unless your doctor suggests them.      Follow treatment recommendations.  Refrain from alcohol and non-prescribed drugs.  If there is a concern for safety, call 911.    Resources: Mental health crisis response for your [facility] is offered 24 hours a day, 7 days a week. A trained counselor will assess your current situation, offer support and counseling and connect you with local resources. Please  call [phone]    The treatment team has appreciated the opportunity to work with you. Miya, please take care and make your recovery a daily recovery. If you have any questions or concerns our unit number is [phone].  You will be receiving a follow-up phone call within the next three days from a representative from behavioral health.  You have identified the best phone number to reach you as [phone]

## 2021-07-31 ENCOUNTER — OFFICE VISIT (OUTPATIENT)
Dept: URGENT CARE | Facility: URGENT CARE | Age: 26
End: 2021-07-31
Payer: COMMERCIAL

## 2021-07-31 ENCOUNTER — ANCILLARY PROCEDURE (OUTPATIENT)
Dept: GENERAL RADIOLOGY | Facility: CLINIC | Age: 26
End: 2021-07-31
Attending: FAMILY MEDICINE
Payer: COMMERCIAL

## 2021-07-31 VITALS
OXYGEN SATURATION: 100 % | SYSTOLIC BLOOD PRESSURE: 120 MMHG | HEIGHT: 63 IN | BODY MASS INDEX: 22.15 KG/M2 | TEMPERATURE: 99.3 F | WEIGHT: 125 LBS | HEART RATE: 78 BPM | DIASTOLIC BLOOD PRESSURE: 72 MMHG

## 2021-07-31 DIAGNOSIS — S99.911A ANKLE INJURY, RIGHT, INITIAL ENCOUNTER: Primary | ICD-10-CM

## 2021-07-31 PROCEDURE — 99204 OFFICE O/P NEW MOD 45 MIN: CPT | Performed by: FAMILY MEDICINE

## 2021-07-31 PROCEDURE — 73610 X-RAY EXAM OF ANKLE: CPT | Mod: RT | Performed by: RADIOLOGY

## 2021-07-31 ASSESSMENT — MIFFLIN-ST. JEOR: SCORE: 1291.13

## 2021-07-31 NOTE — PROGRESS NOTES
"SUBJECTIVE:  Chief Complaint   Patient presents with     Urgent Care     rolled her right ankle today    .ident presents with a chief complaint of right ankle.  The injury occurred 1 hours ago.   The injury happened while while walking.   How: trauma: immediate pain  The patient complained of moderate pain and has had decreased ROM.    Pain exacerbated by weight-bearing        No past medical history on file.  Allergies   Allergen Reactions     Bactrim [Sulfamethoxazole W/Trimethoprim]      Gardasil [Quadrivalent Hpv, 6,11,16,18] Unknown     Social History     Tobacco Use     Smoking status: Never Smoker     Smokeless tobacco: Never Used   Substance Use Topics     Alcohol use: Not on file       ROSINTEGUMENTARY/SKIN: NEGATIVE for open wound/bleeding and NEGATIVE for bruising    EXAM: /72   Pulse 78   Temp 99.3  F (37.4  C)   Ht 1.6 m (5' 3\")   Wt 56.7 kg (125 lb)   SpO2 100%   BMI 22.14 kg/m  Gen: healthy,alert,no distress  Extremity: ankle has pain with palpation and rom.   There is not compromise to the distal circulation.  Pulses are +2 and CRT is brisk.GENERAL APPEARANCE: healthy, alert and no distress  EXTREMITIES: peripheral pulses normal  SKIN: no suspicious lesions or rashes  NEURO: Normal strength and tone, sensory exam grossly normal, mentation intact and speech normal    Xray without acute findings, no fx read by Dayron Le D.O.      ICD-10-CM    1. Ankle injury, right, initial encounter  S99.911A XR Ankle Right G/E 3 Views     Orthopedic  Referral     Crutches Order for DME - ONLY FOR DME     Ankle/Foot Bracing Supplies Order for DME - ONLY FOR DME       RICE    "

## 2021-07-31 NOTE — LETTER
[facility]  [address] [address]  [phone]      July 31, 2021    RE:  Miya Perez                                                                                                                                                       [id number] [facility] [id number]            To whom it may concern:    Miya Perez is under my professional care for Medical.   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          Sincerely,        Dayron Le DO    [facility]

## 2021-08-02 ENCOUNTER — TELEPHONE (OUTPATIENT)
Dept: URGENT CARE | Facility: URGENT CARE | Age: 26
End: 2021-08-02

## 2021-08-02 NOTE — TELEPHONE ENCOUNTER
Pt has urgent 3-5 day pod referral- please triage and advise/schedule appropriately.  No openings within 3-5 days     Thank you,     Nalini GREEN [facility]

## 2021-08-03 NOTE — TELEPHONE ENCOUNTER
Pt has appt with [employer] at this time. Mom works for [employer] and had appt made with them.    Lolita MISTRY RN Specialty Triage 8/3/2021 12:41 PM